# Patient Record
Sex: FEMALE | URBAN - METROPOLITAN AREA
[De-identification: names, ages, dates, MRNs, and addresses within clinical notes are randomized per-mention and may not be internally consistent; named-entity substitution may affect disease eponyms.]

---

## 2024-02-13 ENCOUNTER — LAB (OUTPATIENT)
Dept: URBAN - METROPOLITAN AREA MEDICAL CENTER 18 | Facility: MEDICAL CENTER | Age: 39
End: 2024-02-13
Payer: COMMERCIAL

## 2024-02-13 DIAGNOSIS — E80.6 ABNORMAL BILIRUBIN TEST: ICD-10-CM

## 2024-02-13 DIAGNOSIS — R74.01 ABNORMAL/ELEVATED TRANSAMINASE (SGOT, AMINOTRANSFERASE): ICD-10-CM

## 2024-02-13 DIAGNOSIS — R10.84 ABDOMINAL CRAMPING, GENERALIZED: ICD-10-CM

## 2024-02-13 DIAGNOSIS — R17 CHOLESTATIC JAUNDICE: ICD-10-CM

## 2024-02-13 PROCEDURE — 99254 IP/OBS CNSLTJ NEW/EST MOD 60: CPT | Performed by: STUDENT IN AN ORGANIZED HEALTH CARE EDUCATION/TRAINING PROGRAM

## 2024-02-14 ENCOUNTER — LAB (OUTPATIENT)
Dept: URBAN - METROPOLITAN AREA MEDICAL CENTER 18 | Facility: MEDICAL CENTER | Age: 39
End: 2024-02-14
Payer: COMMERCIAL

## 2024-02-14 DIAGNOSIS — R10.84 ABDOMINAL CRAMPING, GENERALIZED: ICD-10-CM

## 2024-02-14 DIAGNOSIS — R17 CHOLESTATIC JAUNDICE: ICD-10-CM

## 2024-02-14 DIAGNOSIS — E80.6 ABNORMAL BILIRUBIN TEST: ICD-10-CM

## 2024-02-14 DIAGNOSIS — R74.01 ABNORMAL/ELEVATED TRANSAMINASE (SGOT, AMINOTRANSFERASE): ICD-10-CM

## 2024-02-14 PROCEDURE — 99232 SBSQ HOSP IP/OBS MODERATE 35: CPT | Performed by: STUDENT IN AN ORGANIZED HEALTH CARE EDUCATION/TRAINING PROGRAM

## 2024-02-15 ENCOUNTER — LAB (OUTPATIENT)
Dept: URBAN - METROPOLITAN AREA MEDICAL CENTER 18 | Facility: MEDICAL CENTER | Age: 39
End: 2024-02-15
Payer: COMMERCIAL

## 2024-02-15 DIAGNOSIS — R10.84 ABDOMINAL CRAMPING, GENERALIZED: ICD-10-CM

## 2024-02-15 DIAGNOSIS — E80.6 ABNORMAL BILIRUBIN TEST: ICD-10-CM

## 2024-02-15 DIAGNOSIS — R17 CHOLESTATIC JAUNDICE: ICD-10-CM

## 2024-02-15 DIAGNOSIS — R74.01 ABNORMAL/ELEVATED TRANSAMINASE (SGOT, AMINOTRANSFERASE): ICD-10-CM

## 2024-02-15 PROCEDURE — 99232 SBSQ HOSP IP/OBS MODERATE 35: CPT | Performed by: STUDENT IN AN ORGANIZED HEALTH CARE EDUCATION/TRAINING PROGRAM

## 2024-02-15 PROCEDURE — 99231 SBSQ HOSP IP/OBS SF/LOW 25: CPT | Performed by: STUDENT IN AN ORGANIZED HEALTH CARE EDUCATION/TRAINING PROGRAM

## 2024-03-01 ENCOUNTER — OV HOSPF/U (OUTPATIENT)
Dept: URBAN - METROPOLITAN AREA CLINIC 80 | Facility: CLINIC | Age: 39
End: 2024-03-01
Payer: COMMERCIAL

## 2024-03-01 VITALS
DIASTOLIC BLOOD PRESSURE: 83 MMHG | TEMPERATURE: 98.1 F | WEIGHT: 116.2 LBS | SYSTOLIC BLOOD PRESSURE: 124 MMHG | BODY MASS INDEX: 20.59 KG/M2 | HEART RATE: 81 BPM | HEIGHT: 63 IN

## 2024-03-01 DIAGNOSIS — R10.13 DYSPEPSIA: ICD-10-CM

## 2024-03-01 DIAGNOSIS — K82.4 GALLBLADDER POLYP: ICD-10-CM

## 2024-03-01 DIAGNOSIS — R17 JAUNDICE: ICD-10-CM

## 2024-03-01 DIAGNOSIS — K71.9 DRUG-INDUCED LIVER INJURY: ICD-10-CM

## 2024-03-01 PROBLEM — 427399008: Status: ACTIVE | Noted: 2024-03-01

## 2024-03-01 PROCEDURE — 99204 OFFICE O/P NEW MOD 45 MIN: CPT | Performed by: STUDENT IN AN ORGANIZED HEALTH CARE EDUCATION/TRAINING PROGRAM

## 2024-03-01 RX ORDER — OMEPRAZOLE 20 MG/1
1 CAPSULE 30 MINUTES BEFORE MORNING MEAL CAPSULE, DELAYED RELEASE ORAL ONCE A DAY
Qty: 30 | Refills: 3 | OUTPATIENT
Start: 2024-03-01

## 2024-03-01 NOTE — HPI-TODAY'S VISIT:
Ms. Mahoney is a 38yoF who presents for hospital follow-up. She was sent to hospital by PCP for abdominal pain and jaundice. The patient reports her symptoms started after a trip to [place] in January.  She initially had multiple episodes of diarrhea for couple of days and fever which later subsided. Then developed pain and rash on the left ear and was diagnosed with shingles and treated with steroids and bactrim. She had allergic reaction to bactrim with fever and had medrol dose pack that resolved her fever. After she was done with steroids she developed daily fever, fatigue some abdominal discomfort but denies any nausea or vomiting, no change in her appetite. She was fatigued and itching all over so she saw her PCP.   She denies any history of liver problems, she denies taking any supplements or herbal treatments. She was seen by myself and ID while admitted. Labs on admission: total bili 4.5 from prior day 6.7 , alk phos 174 from 229,  from 222,  from 475, hepatitis panel negative, INR 0.82. LFTs improved daily. At discharge, , , , and T. bili 2.9. PLT and INR normal. Serolgoic evaluation for chronic liver disease negative. CMV, syphillis, and HIV undetected. EBV PCR almost undetectable.  She reports more appetite but pain with eating in lower abdomen (left periumbilical). This has been present for last 4 days. No further jaundice or pruritis. Has BM 1-2 times per day including this week.  CT 2/12/24: No acute CT abnormality in the abdomen or pelvis.Multiple small hyper enhancing uterine lesions likely represent fibroids.  RUQ US 2/12/24: Distended gallbladder with no definite wall thickening. 5 mm common duct. There is a suspected small gallbladder polyp measuring around 2 mm.There was an echogenic shadowing structure suggesting calcification of the liver measuring 6 x 5 x 8 mm

## 2024-03-02 LAB
ABSOLUTE BASOPHILS: 132
ABSOLUTE EOSINOPHILS: 310
ABSOLUTE LYMPHOCYTES: 2209
ABSOLUTE MONOCYTES: 677
ABSOLUTE NEUTROPHILS: 6072
ALBUMIN/GLOBULIN RATIO: 1.5
ALBUMIN: 4.4
ALKALINE PHOSPHATASE: 95
ALT: 134
AST: 40
BASOPHILS: 1.4
BILIRUBIN, TOTAL: 1.1
BUN/CREATININE RATIO: (no result)
CALCIUM: 10.5
CARBON DIOXIDE: 29
CHLORIDE: 102
CREATININE: 0.82
EGFR: 94
EOSINOPHILS: 3.3
FIB 4 INDEX: 0.27
GLOBULIN: 3
GLUCOSE: 97
HEMATOCRIT: 42.7
HEMOGLOBIN: 13.7
INR: 0.9
LYMPHOCYTES: 23.5
MCH: 30.2
MCHC: 32.1
MCV: 94.3
MONOCYTES: 7.2
MPV: 9.9
NEUTROPHILS: 64.6
PLATELET COUNT: 488
PLATELET COUNT: 488
POTASSIUM: 4.5
PROTEIN, TOTAL: 7.4
PT: 9.6
RDW: 12.6
RED BLOOD CELL COUNT: 4.53
SODIUM: 139
UREA NITROGEN (BUN): 9
WHITE BLOOD CELL COUNT: 9.4

## 2024-03-07 LAB — H. PYLORI (EIA) - QDX: NEGATIVE

## 2024-05-10 ENCOUNTER — DASHBOARD ENCOUNTERS (OUTPATIENT)
Age: 39
End: 2024-05-10

## 2024-05-10 ENCOUNTER — OFFICE VISIT (OUTPATIENT)
Dept: URBAN - METROPOLITAN AREA CLINIC 80 | Facility: CLINIC | Age: 39
End: 2024-05-10
Payer: COMMERCIAL

## 2024-05-10 VITALS
BODY MASS INDEX: 21.05 KG/M2 | HEART RATE: 86 BPM | TEMPERATURE: 98.1 F | DIASTOLIC BLOOD PRESSURE: 28 MMHG | HEIGHT: 63 IN | WEIGHT: 118.8 LBS | SYSTOLIC BLOOD PRESSURE: 128 MMHG

## 2024-05-10 DIAGNOSIS — R10.13 DYSPEPSIA: ICD-10-CM

## 2024-05-10 DIAGNOSIS — K71.9 DRUG-INDUCED LIVER INJURY: ICD-10-CM

## 2024-05-10 DIAGNOSIS — K82.4 GALLBLADDER POLYP: ICD-10-CM

## 2024-05-10 DIAGNOSIS — R79.89 ELEVATED LFTS: ICD-10-CM

## 2024-05-10 PROCEDURE — 99214 OFFICE O/P EST MOD 30 MIN: CPT | Performed by: STUDENT IN AN ORGANIZED HEALTH CARE EDUCATION/TRAINING PROGRAM

## 2024-05-10 RX ORDER — OMEPRAZOLE 20 MG/1
1 CAPSULE 30 MINUTES BEFORE MORNING MEAL CAPSULE, DELAYED RELEASE ORAL ONCE A DAY
Qty: 90 | Refills: 3 | Status: ACTIVE | COMMUNITY
Start: 2024-03-01

## 2024-05-10 RX ORDER — FAMOTIDINE 40 MG/1
1 TABLET AT BEDTIME TABLET, FILM COATED ORAL TWICE A DAY
Qty: 180 TABLET | Refills: 3 | OUTPATIENT
Start: 2024-05-10

## 2024-05-11 LAB
ALBUMIN/GLOBULIN RATIO: 1.5
ALBUMIN: 4.4
ALKALINE PHOSPHATASE: 56
ALT: 9
AST: 16
BILIRUBIN, TOTAL: 0.8
BUN/CREATININE RATIO: (no result)
CALCIUM: 9.4
CARBON DIOXIDE: 25
CHLORIDE: 102
CREATININE: 0.65
EGFR: 115
FIB 4 INDEX: 0.46
GLOBULIN: 2.9
GLUCOSE: 56
PLATELET COUNT: 451
POTASSIUM: 4.4
PROTEIN, TOTAL: 7.3
SODIUM: 137
UREA NITROGEN (BUN): 14